# Patient Record
Sex: FEMALE | Race: OTHER | HISPANIC OR LATINO | Employment: PART TIME | URBAN - METROPOLITAN AREA
[De-identification: names, ages, dates, MRNs, and addresses within clinical notes are randomized per-mention and may not be internally consistent; named-entity substitution may affect disease eponyms.]

---

## 2018-03-28 ENCOUNTER — OFFICE VISIT (OUTPATIENT)
Dept: URGENT CARE | Facility: MEDICAL CENTER | Age: 43
End: 2018-03-28
Payer: COMMERCIAL

## 2018-03-28 VITALS
WEIGHT: 135 LBS | HEART RATE: 68 BPM | TEMPERATURE: 96.9 F | HEIGHT: 66 IN | OXYGEN SATURATION: 97 % | DIASTOLIC BLOOD PRESSURE: 58 MMHG | BODY MASS INDEX: 21.69 KG/M2 | RESPIRATION RATE: 18 BRPM | SYSTOLIC BLOOD PRESSURE: 110 MMHG

## 2018-03-28 DIAGNOSIS — G44.319 ACUTE POST-TRAUMATIC HEADACHE, NOT INTRACTABLE: Primary | ICD-10-CM

## 2018-03-28 DIAGNOSIS — S16.1XXA STRAIN OF MUSCLE, FASCIA AND TENDON AT NECK LEVEL, INITIAL ENCOUNTER: ICD-10-CM

## 2018-03-28 PROCEDURE — G0382 LEV 3 HOSP TYPE B ED VISIT: HCPCS | Performed by: PHYSICIAN ASSISTANT

## 2018-03-28 RX ORDER — CYCLOBENZAPRINE HCL 10 MG
10 TABLET ORAL 3 TIMES DAILY PRN
Qty: 30 TABLET | Refills: 0 | Status: SHIPPED | OUTPATIENT
Start: 2018-03-28

## 2018-03-28 RX ORDER — LEVOTHYROXINE SODIUM 0.12 MG/1
125 TABLET ORAL DAILY
COMMUNITY

## 2018-03-28 RX ORDER — IBUPROFEN 800 MG/1
800 TABLET ORAL EVERY 8 HOURS PRN
Qty: 30 TABLET | Refills: 0 | Status: SHIPPED | OUTPATIENT
Start: 2018-03-28

## 2018-03-28 NOTE — PATIENT INSTRUCTIONS
Dolor de pamela ольга   LO QUE NECESITA SABER:   El dolor de pamela ольга comienza repentinamente, [place] rápidamente y desaparece en unos días  El dolor podría ir y venir, o podría empeorar con ciertos movimientos  El dolor podría sentirse solamente en shelton pamela, o podría pasarse a reyna brazos, espalda u hombros  También podría sentir dolor que comienza en otra área de shelton cuerpo y se pasa a shelton pamela  INSTRUCCIONES SOBRE [facility]:   Regrese a la julianna de emergencias si:   · Usted tiene raúl lesión que le provoca dolor en el pamela y dolor punzante debajo de reyna brazos o piernas  · Shelton dolor de pamela se agrava repentinamente  · Usted tiene dolor de pamela junto con entumecimiento, hormigueo o debilidad en reyna brazos o piernas  · Usted tiene rigidez en el pamela, dolor de Lujean Been y [place]  Pregúntele a shelton [place] vitaminas y minerales son adecuados para usted  · Usted tiene nuevos síntomas o reyna síntomas empeoran  · Reyna síntomas continúan aún después del [place]  · Usted tiene preguntas o inquietudes acerca de shelton condición o cuidado  Medicamentos:   · AINEs (Analgésicos antiinflamatorios no esteroides) lux el ibuprofeno, ayudan a disminuir la inflamación, el dolor y la fiebre  Estos medicamentos pueden ser comprados sin orden de un médico  Pregunte a shelton médico cuál medicamento nicolas y con qué frecuencia  [address]  Cuando no se kasey de la [employer], los medicamentos antiinflamatorios no esteroides pueden causar sangrado estomacal o problemas renales  Si usted esta tomando un anticoágulante,  siempre  pregunte si los AINEs son seguros para usted  · El acetaminofén  sirve para calmar el dolor y bajar la fiebre del [place]  Pregunte a shelton médico cuánto nicolas y con qué frecuencia  [address]  El acetaminofén puede causar daño en el hígado cuando no se jesus de forma correcta  · Medicamentos esteroideos  podría usarse para reducir la inflamación  [place] puede ayudarlo a aliviar el dolor a causa de la inflamación  · [place] valdemar medicamentos lux se le haya indicado  Consulte con shelton médico si usted jeanette que shelton medicamento no le está ayudando o si presenta efectos secundarios  Infórmele si es alérgico a cualquier medicamento  Mantenga raúl lista actualizada de los [place], las vitaminas y los productos herbales que jesus  Incluya los siguientes datos de los medicamentos: cantidad, frecuencia y motivo de administración  Traiga con usted la lista o los envases de la píldoras a valdemar citas de seguimiento  Lleve la lista de los medicamentos con usted en sekou de raúl emergencia  Controle o evite el dolor de pamela ольга:   · Repose el pamela lux se lo indiquen  No realice movimientos repentinos, lux voltear shelton eduin rápidamente  Shelton médico podría recomendarle que use un collarín cervical por un periodo corto de [place]  El collarín evitará que usted [place] shelton [place]  [place] ayudará a darle tiempo a shelton pamela para que sane si es que raúl lesión está provocando shelton dolor  Pregunte a shelton médico cuándo puede volver a practicar deportes o realizar otras actividades cotidianas  · Aplique calor según indicaciones  El calor ayuda a aliviar el dolor y la inflamación  Use raúl envoltura caliente o empape raúl toalla pequeña en agua tibia  Escurra el exceso de [place]  Aplique la venda caliente o la toalla por 20 minutos cada hora o lux se le indique  · Aplique hielo según las indicaciones  El hielo ayuda a aliviar el dolor y la inflamación, y a evitar daño al tejido  Use un paquete con hielo o ponga hielo en raúl bolsa  Cubra el paquete con hielo o la espalda con raúl toalla antes de aplicarlo en shelton pamela  Aplique el paquete de hielo o la bolsa por 15 minutos cada hora o lux se lo indiquen  Shelton médico puede indicarle con qué frecuencia aplicar el hielo  · [place] ejercicios para el pamela lux se lo indiquen    Los ejercicios para el pamela ayudan a [employer] y a aumentar el rango de [place]  Tolbert médico le indicará cuáles ejercicios son adecuados para usted  Podría darle instrucciones o podría recomendarle que acuda con un fisioterapeuta  Tolbert médico o terapeuta pueden asegurarse que usted esté haciendo estos ejercicios correctamente  · Mantenga raúl buena postura  Trate de mantener tolbert eduin y hombros levantados cuando se siente  Si usted trabaja en frente de raúl computadora, asegúrese de que el monitor esté al nivel adecuado  Usted no debería tener que mirar hacia abajo para leoncio la pantalla  [place] debe tener que inclinarse hacia adelante para poder leer lo que está en la pantalla  Asegúrese de que tlobert teclado, ratón y otros artículos de computadora estén colocados en donde usted no tenga que extender valdemar hombros para alcanzarlos  [place] a menudo si usted trabaja frente a raúl computadora o permanece sentado jose largos períodos  Estírese o camine para [place] de tolbert pamela relajados  Acuda a valdemar consultas de control con tolbert médico según le indicaron  Tolbert médico podría referirlo a un especialista si tolbert dolor no mejora con el tratamiento  Anote valdemar preguntas para que se acuerde de hacerlas jose valdemar visitas  © 2017 [facility] Information is for End User's use only and may not be sold, redistributed or otherwise used for commercial purposes  All illustrations and images included in CareNotes® are the copyrighted property of A D A M , Inc  or Tk Gordon  Esta información es sólo para uso en educación  Tolbert intención no es darle un consejo médico sobre enfermedades o tratamientos  Colsulte con tolbert Martínez Saavedra farmacéutico antes de seguir cualquier régimen médico para saber si es seguro y efectivo para usted
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
